# Patient Record
Sex: MALE | Race: BLACK OR AFRICAN AMERICAN | HISPANIC OR LATINO
[De-identification: names, ages, dates, MRNs, and addresses within clinical notes are randomized per-mention and may not be internally consistent; named-entity substitution may affect disease eponyms.]

---

## 2019-12-12 ENCOUNTER — TRANSCRIPTION ENCOUNTER (OUTPATIENT)
Age: 34
End: 2019-12-12

## 2020-09-27 ENCOUNTER — TRANSCRIPTION ENCOUNTER (OUTPATIENT)
Age: 35
End: 2020-09-27

## 2021-02-06 ENCOUNTER — TRANSCRIPTION ENCOUNTER (OUTPATIENT)
Age: 36
End: 2021-02-06

## 2023-01-12 ENCOUNTER — NON-APPOINTMENT (OUTPATIENT)
Age: 38
End: 2023-01-12

## 2023-02-20 PROBLEM — Z00.00 ENCOUNTER FOR PREVENTIVE HEALTH EXAMINATION: Status: ACTIVE | Noted: 2023-02-20

## 2023-02-22 ENCOUNTER — APPOINTMENT (OUTPATIENT)
Dept: NEUROLOGY | Facility: CLINIC | Age: 38
End: 2023-02-22
Payer: COMMERCIAL

## 2023-02-22 VITALS
DIASTOLIC BLOOD PRESSURE: 89 MMHG | WEIGHT: 207 LBS | HEART RATE: 60 BPM | HEIGHT: 69 IN | BODY MASS INDEX: 30.66 KG/M2 | SYSTOLIC BLOOD PRESSURE: 138 MMHG

## 2023-02-22 PROCEDURE — 99204 OFFICE O/P NEW MOD 45 MIN: CPT

## 2023-02-22 RX ORDER — DIVALPROEX SODIUM 500 MG/1
500 TABLET, DELAYED RELEASE ORAL 3 TIMES DAILY
Qty: 270 | Refills: 0 | Status: ACTIVE | COMMUNITY
Start: 2023-02-22 | End: 1900-01-01

## 2023-02-22 NOTE — PHYSICAL EXAM

## 2023-02-22 NOTE — HISTORY OF PRESENT ILLNESS
[FreeTextEntry1] : Mr. Trujillo is a 37 year old man who come in for Seizures. He had his first Seizure when he was 18 years old. He reports of not having a seizure in 6 years and continues the medication but would like to taper off of it.  he does not follow-up with a neurologist  and does not have a primary doctor either.  He gets refill of his medication at urgent care. He does drive. No recent imaging done as well.\par He takes Depakote 500 mg 1 tablet in the morning and 2 tabs at night\par \par Normal Neurological Exam.

## 2023-02-22 NOTE — ASSESSMENT
[FreeTextEntry1] : Seizures.\par \par - MRI of the Brain without contrast.\par - Bone Density. \par - EEG. \par - Blood Work. \par -  Continue with current medication at this time\par - I Will Follow up with him in One Month.  \par \par \par \par \par I, Eleanor Goodman, Attest that this documentation has been prepared under the direction and in the presence of Provider Unruly Urbina DO\par \par Thank You for letting me assist in the management of this patient. \par \par Unruly Urbina DO\par Board Certified, Neurology\par

## 2023-03-13 ENCOUNTER — APPOINTMENT (OUTPATIENT)
Dept: MRI IMAGING | Facility: CLINIC | Age: 38
End: 2023-03-13
Payer: COMMERCIAL

## 2023-03-13 ENCOUNTER — APPOINTMENT (OUTPATIENT)
Dept: NEUROLOGY | Facility: CLINIC | Age: 38
End: 2023-03-13
Payer: COMMERCIAL

## 2023-03-13 PROCEDURE — 95816 EEG AWAKE AND DROWSY: CPT

## 2023-03-13 PROCEDURE — 70551 MRI BRAIN STEM W/O DYE: CPT

## 2023-03-22 ENCOUNTER — APPOINTMENT (OUTPATIENT)
Dept: NEUROLOGY | Facility: CLINIC | Age: 38
End: 2023-03-22

## 2023-05-04 ENCOUNTER — APPOINTMENT (OUTPATIENT)
Dept: NEUROLOGY | Facility: CLINIC | Age: 38
End: 2023-05-04
Payer: COMMERCIAL

## 2023-05-04 DIAGNOSIS — R56.9 UNSPECIFIED CONVULSIONS: ICD-10-CM

## 2023-05-04 DIAGNOSIS — F51.12 INSUFFICIENT SLEEP SYNDROME: ICD-10-CM

## 2023-05-04 DIAGNOSIS — R61 GENERALIZED HYPERHIDROSIS: ICD-10-CM

## 2023-05-04 PROCEDURE — 99214 OFFICE O/P EST MOD 30 MIN: CPT

## 2023-05-04 NOTE — HISTORY OF PRESENT ILLNESS
[FreeTextEntry1] : Mr. CHRIS KEARNEY returns to the office for follow-up and his prior history and physical have been reviewed and he reports no change since last visit.  he  was last seen for seizure evaluation.  His last seizure was over 7 years ago but he has continued to take Depakote for seizure prevention which he would like to stop.  He was sent for MRI of the brain which was normal, EEG which was normal as well.  He was supposed to go for blood work  for Depakote level which he did not do.    His seizures in the past had always been partial motor seizure that secondarily generalized.\par \par One new complaint that he has is that once every 2 months or so he will wake up with night sweats, and this is has been going on for years.  He denies snoring, gasping, witnessed apneic episodes.  He only sleeps about 6 hours a night and does wake up feeling tired at times.    He is not interested in doing a sleep study at this time\par \par \par

## 2023-05-04 NOTE — PHYSICAL EXAM

## 2023-05-04 NOTE — REASON FOR VISIT
[Follow-Up: _____] : a [unfilled] follow-up visit [Consultation] : a consultation visit [FreeTextEntry1] : New.

## 2023-05-04 NOTE — ASSESSMENT
[FreeTextEntry1] :  epilepsy\par -seizure-free for 7 years, recent MRI of the brain, EEG were normal.  \par - can start taping up Depakote but should have level checked before.  Patient is to take Depakote 500 mg twice a day incident of 3 tablets a day for the next month.  Depending on the Depakote level, it will guide us as to how fast he can be taper up Depakote.  \par - safety precautions, warning given to patient regarding potential breakthrough seizures when tapering off medication, and he reported understanding\par \par  insufficient sleep syndrome \par - most likely cause for daytime sleepiness.  I went over the importance of having enough hours of sleep for general medical health as well as decrease risk of seizures.  \par -I would like to send him for a sleep study to rule out sleep apnea, but he declined \par \par Night sweats\par -  since it is chronic in nature, less likely to be infectious in etiology.  I recommend a trial of final B complex\par \par \par \par \par \par \par \par I, Eleanor Goodman, Attest that this documentation has been prepared under the direction and in the presence of Provider Unruly Urbina DO\arabella \par Thank You for letting me assist in the management of this patient. \par \par Unruly Urbina DO\par Board Certified, Neurology\par

## 2023-05-05 ENCOUNTER — NON-APPOINTMENT (OUTPATIENT)
Age: 38
End: 2023-05-05

## 2023-05-27 ENCOUNTER — NON-APPOINTMENT (OUTPATIENT)
Age: 38
End: 2023-05-27

## 2023-07-08 ENCOUNTER — NON-APPOINTMENT (OUTPATIENT)
Age: 38
End: 2023-07-08

## 2023-11-02 ENCOUNTER — APPOINTMENT (OUTPATIENT)
Dept: NEUROLOGY | Facility: CLINIC | Age: 38
End: 2023-11-02

## 2024-02-22 ENCOUNTER — NON-APPOINTMENT (OUTPATIENT)
Age: 39
End: 2024-02-22

## 2024-07-05 ENCOUNTER — NON-APPOINTMENT (OUTPATIENT)
Age: 39
End: 2024-07-05

## 2024-09-03 ENCOUNTER — NON-APPOINTMENT (OUTPATIENT)
Age: 39
End: 2024-09-03

## 2025-01-11 ENCOUNTER — NON-APPOINTMENT (OUTPATIENT)
Age: 40
End: 2025-01-11

## 2025-06-19 ENCOUNTER — NON-APPOINTMENT (OUTPATIENT)
Age: 40
End: 2025-06-19